# Patient Record
Sex: FEMALE | Race: BLACK OR AFRICAN AMERICAN | Employment: FULL TIME | URBAN - METROPOLITAN AREA
[De-identification: names, ages, dates, MRNs, and addresses within clinical notes are randomized per-mention and may not be internally consistent; named-entity substitution may affect disease eponyms.]

---

## 2018-12-25 ENCOUNTER — HOSPITAL ENCOUNTER (EMERGENCY)
Age: 58
Discharge: HOME | End: 2018-12-25
Payer: COMMERCIAL

## 2018-12-25 ENCOUNTER — APPOINTMENT (OUTPATIENT)
Dept: GENERAL RADIOLOGY | Age: 58
End: 2018-12-25
Payer: COMMERCIAL

## 2018-12-25 VITALS
TEMPERATURE: 98 F | DIASTOLIC BLOOD PRESSURE: 72 MMHG | SYSTOLIC BLOOD PRESSURE: 146 MMHG | HEART RATE: 102 BPM | OXYGEN SATURATION: 96 % | WEIGHT: 250 LBS | RESPIRATION RATE: 15 BRPM

## 2018-12-25 DIAGNOSIS — S92.425A CLOSED NONDISPLACED FRACTURE OF DISTAL PHALANX OF LEFT GREAT TOE, INITIAL ENCOUNTER: Primary | ICD-10-CM

## 2018-12-25 PROCEDURE — 73660 X-RAY EXAM OF TOE(S): CPT

## 2018-12-25 PROCEDURE — 99283 EMERGENCY DEPT VISIT LOW MDM: CPT

## 2018-12-25 PROCEDURE — 4500000021 HC ED LEVEL 1 PROCEDURE

## 2018-12-25 RX ORDER — LIDOCAINE HYDROCHLORIDE 20 MG/ML
10 INJECTION, SOLUTION INFILTRATION; PERINEURAL ONCE
Status: DISCONTINUED | OUTPATIENT
Start: 2018-12-25 | End: 2018-12-25 | Stop reason: HOSPADM

## 2018-12-25 RX ORDER — BUPIVACAINE HYDROCHLORIDE 5 MG/ML
30 INJECTION, SOLUTION EPIDURAL; INTRACAUDAL ONCE
Status: DISCONTINUED | OUTPATIENT
Start: 2018-12-25 | End: 2018-12-25 | Stop reason: HOSPADM

## 2018-12-25 RX ORDER — CEPHALEXIN 500 MG/1
500 CAPSULE ORAL 4 TIMES DAILY
Qty: 40 CAPSULE | Refills: 0 | Status: SHIPPED | OUTPATIENT
Start: 2018-12-25 | End: 2019-01-04

## 2018-12-25 RX ORDER — LIDOCAINE HYDROCHLORIDE 20 MG/ML
INJECTION, SOLUTION EPIDURAL; INFILTRATION; INTRACAUDAL; PERINEURAL
Status: DISCONTINUED
Start: 2018-12-25 | End: 2018-12-25 | Stop reason: HOSPADM

## 2018-12-25 RX ORDER — HYDROCODONE BITARTRATE AND ACETAMINOPHEN 5; 325 MG/1; MG/1
1 TABLET ORAL EVERY 6 HOURS PRN
Qty: 15 TABLET | Refills: 0 | Status: SHIPPED | OUTPATIENT
Start: 2018-12-25 | End: 2018-12-30

## 2018-12-25 ASSESSMENT — PAIN SCALES - GENERAL: PAINLEVEL_OUTOF10: 6

## 2018-12-25 ASSESSMENT — PAIN DESCRIPTION - PAIN TYPE: TYPE: ACUTE PAIN

## 2018-12-26 ASSESSMENT — ENCOUNTER SYMPTOMS
COLOR CHANGE: 1
NAUSEA: 0
ABDOMINAL PAIN: 0
DIARRHEA: 0
VOMITING: 0
SHORTNESS OF BREATH: 0
CHEST TIGHTNESS: 0

## 2018-12-26 NOTE — ED PROVIDER NOTES
department and verbalizes an understanding of these signs and symptoms. I estimate there is low risk for compartment syndrome, deep venous thrombosis, limb-threatening infectious, tendon and/or neurovascular injury and therefore I consider the discharge disposition reasonable. Leida Khan and I have discussed the diagnosis and risks, and we agree with discharging home to follow-up with their primary care provider and/or the referring orthopedist on call. We also discussed returning to the emergency department immediately if new or worsening symptoms occur. We have discussed the symptoms which are most concerning (e.g., changing or worsening pain, numbness, weakness) that necessitate immediate return. The patient tolerated their visit well. I evaluated the patient. The physician was available for consultation as needed. The patient and / or the family were informed of the results of anytests, a time was given to answer questions, a plan was proposed and they agreed with plan. CLINICAL IMPRESSION:  1. Closed nondisplaced fracture of distal phalanx of left great toe, initial encounter        DISPOSITION Decision To Discharge 12/25/2018 06:40:21 PM      PATIENT REFERRED TO:  Amalia Dubon, [address]  [phone]    Schedule an appointment as soon as possible for a visit       [facility] Emergency Department  [address] [place] [address]  [phone]    If symptoms worsen      DISCHARGE MEDICATIONS:  Discharge Medication List as of 12/25/2018  6:43 PM      START taking these medications    Details   cephALEXin (KEFLEX) 500 MG capsule Take 1 capsule by mouth 4 times daily for 10 days, Disp-40 capsule, R-0Print      HYDROcodone-acetaminophen (NORCO) 5-325 MG per tablet Take 1 tablet by mouth every 6 hours as needed for Pain for up to 5 days. ., Disp-15 tablet, R-0Print             DISCONTINUED MEDICATIONS:  Discharge Medication List as of 12/25/2018

## 2019-05-09 ENCOUNTER — APPOINTMENT (OUTPATIENT)
Dept: GENERAL RADIOLOGY | Age: 59
End: 2019-05-09
Payer: COMMERCIAL

## 2019-05-09 ENCOUNTER — HOSPITAL ENCOUNTER (EMERGENCY)
Age: 59
Discharge: HOME | End: 2019-05-09
Payer: COMMERCIAL

## 2019-05-09 VITALS
OXYGEN SATURATION: 96 % | DIASTOLIC BLOOD PRESSURE: 80 MMHG | HEART RATE: 80 BPM | SYSTOLIC BLOOD PRESSURE: 150 MMHG | WEIGHT: 262 LBS | RESPIRATION RATE: 18 BRPM | TEMPERATURE: 97.7 F
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
## 2019-05-09 DIAGNOSIS — M23.91 INTERNAL DERANGEMENT OF RIGHT KNEE: Primary | ICD-10-CM

## 2019-05-09 PROCEDURE — 99283 EMERGENCY DEPT VISIT LOW MDM: CPT

## 2019-05-09 PROCEDURE — 6370000000 HC RX 637 (ALT 250 FOR IP): Performed by: NURSE PRACTITIONER

## 2019-05-09 PROCEDURE — 73562 X-RAY EXAM OF KNEE 3: CPT

## 2019-05-09 RX ORDER — IBUPROFEN 800 MG/1
800 TABLET ORAL ONCE
Status: COMPLETED | OUTPATIENT
Start: 2019-05-09 | End: 2019-05-09

## 2019-05-09 RX ORDER — IBUPROFEN 800 MG/1
800 TABLET ORAL EVERY 8 HOURS PRN
Qty: 20 TABLET | Refills: 0 | Status: SHIPPED | OUTPATIENT
Start: 2019-05-09

## 2019-05-09 RX ADMIN — IBUPROFEN 800 MG: 800 TABLET, FILM COATED ORAL at 10:43

## 2019-05-09 SDOH — HEALTH STABILITY: MENTAL HEALTH: HOW OFTEN DO YOU HAVE A DRINK CONTAINING ALCOHOL?: NEVER

## 2019-05-09 ASSESSMENT — ENCOUNTER SYMPTOMS
NAUSEA: 0
SHORTNESS OF BREATH: 0
DIARRHEA: 0
ABDOMINAL PAIN: 0
VOMITING: 0
CHEST TIGHTNESS: 0

## 2019-05-09 ASSESSMENT — PAIN SCALES - GENERAL
PAINLEVEL_OUTOF10: 10
PAINLEVEL_OUTOF10: 10

## 2019-05-09 ASSESSMENT — PAIN DESCRIPTION - PAIN TYPE: TYPE: ACUTE PAIN

## 2019-05-09 NOTE — ED NOTES
Patient discharged to home in stable condition with family via private car. Discharge instructions and prescriptions reviewed with patient and family members. Patient and family verbalized understanding. All belongings in tow including discharge paperwork.                    Gurinder Armstrong RN  05/09/19 9031

## 2019-05-09 NOTE — ED NOTES
Pt back from xray.  Patient brought warm blanket at patient's request.     Keely Arora RN  05/09/19 0942

## 2019-05-09 NOTE — ED PROVIDER NOTES
[facility]        Pt Name: Bri Spears  MRN: [id number]  [place] 1960  Date of evaluation: 5/9/2019  Provider: JENI Trevizo CNP  PCP: Joe Miranda    This patient was not seen and evaluated by the attending physician No att. providers found. CHIEF COMPLAINT       Chief Complaint   Patient presents with    Knee Injury     Pt to ER with right knee pain after twisting wrong and injury her knee at worse, denies previous injury, states unable to bare weight       HISTORY OF PRESENT ILLNESS   (Location/Symptom, Timing/Onset, Context/Setting, Quality, Duration, Modifying Factors, Severity)  Note limiting factors. Bri Spears is a 59 y.o. female presents the emergency department with swelling and pain to the right external knee. 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 She reports pain with flexion and extension, and difficulty with weightbearing. Denies mitigating factors or previous injury. Denies any headache, fever, lightheadedness, dizziness, visual disturbances. No chest pain or pressure. No neck or back pain. No shortness of breath, cough, or congestion. No abdominal pain, nausea, vomiting, diarrhea, constipation, or dysuria. No rash. Nursing Notes were all reviewed and agreed with or any disagreements were addressed  in the HPI. REVIEW OF SYSTEMS    (2-9 systems for level 4, 10 or more for level 5)     Review of Systems   Constitutional: Negative for activity change, chills and fever. Respiratory: Negative for chest tightness and shortness of breath. Cardiovascular: Negative for chest pain. Gastrointestinal: Negative for abdominal pain, diarrhea, nausea and vomiting. Genitourinary: Negative for dysuria.    Musculoskeletal: Positive for joint swelling. All other systems reviewed and are negative. Positives and Pertinent negatives as per HPI. Except as noted abovein the ROS, all other systems were reviewed and negative. PAST MEDICAL HISTORY     Past Medical History:   Diagnosis Date    Diabetes mellitus ([address]     Hyperlipidemia     Hypertension          SURGICAL HISTORY     Past Surgical History:   Procedure Laterality Date    HYSTERECTOMY      TONSILLECTOMY           CURRENTMEDICATIONS       Discharge Medication List as of 5/9/2019 12:01 PM      CONTINUE these medications which have NOT CHANGED    Details   METFORMIN HCL PO Take by mouthHistorical Med      LISINOPRIL PO Take by mouthHistorical Med      HYDROCHLOROTHIAZIDE PO Take by mouthHistorical Med      Multiple Vitamins-Minerals (MULTIVITAMIN ADULTS PO) Take by mouthHistorical Med      aspirin 81 MG tablet Take 81 mg by mouth dailyHistorical Med      FERROUS SULFATE DRIED PO Take by mouthHistorical Med               ALLERGIES     Patient has no known allergies. FAMILYHISTORY     History reviewed. No pertinent family history.        SOCIAL HISTORY       Social History     Socioeconomic History    Marital status:      Spouse name: None    Number of children: None    Years of education: None    Highest education level: None   Occupational History    None   Social Needs    Financial resource strain: None    Food insecurity:     Worry: None     Inability: None    Transportation needs:     Medical: None     Non-medical: None   Tobacco Use    Smoking status: Never Smoker    Smokeless tobacco: Never Used   Substance and Sexual Activity    Alcohol use: Yes     Frequency: Never     Comment: occ    Drug use: Never    Sexual activity: None   Lifestyle    Physical activity:     Days per week: None     Minutes per session: None    Stress: None   Relationships    Social connections:     Talks on phone: None     Gets together: None     Attends Anabaptist service: None Active member of club or organization: None     Attends meetings of clubs or organizations: None     Relationship status: None    Intimate partner violence:     Fear of current or ex partner: None     Emotionally abused: None     Physically abused: None     Forced sexual activity: None   Other Topics Concern    None   Social History Narrative    None       SCREENINGS             PHYSICAL EXAM    (up to 7 for level 4, 8 or more for level 5)     ED Triage Vitals   BP Temp Temp src Pulse Resp SpO2 Height Weight   -- -- -- -- -- -- -- --       Physical Exam   Constitutional: She is oriented to person, place, and time. She appears well-developed and well-nourished. No distress. HENT:   Head: Normocephalic and atraumatic. Right Ear: External ear normal.   Left Ear: External ear normal.   Eyes: Right eye exhibits no discharge. Left eye exhibits no discharge. Neck: Normal range of motion. Neck supple. No JVD present. Cardiovascular: Normal rate and regular rhythm. Exam reveals no friction rub. No murmur heard. Pulmonary/Chest: Effort normal and breath sounds normal. No stridor. No respiratory distress. She has no wheezes. Abdominal: Soft. She exhibits no mass. There is no tenderness. Musculoskeletal: Normal range of motion. Right knee: She exhibits swelling. Tenderness found. Neurological: She is alert and oriented to person, place, and time. Skin: Skin is warm and dry. She is not diaphoretic. No pallor. Psychiatric: She has a normal mood and affect. Her behavior is normal.   Nursing note and vitals reviewed. DIAGNOSTIC RESULTS   LABS:    Labs Reviewed - No data to display    All other labs were within normal range or not returned as of this dictation. EKG: All EKG's are interpreted by the Emergency Department Physician who either signs orCo-signs this chart in the absence of a cardiologist.  Please see their note for interpretation of EKG.       RADIOLOGY:   Non-plain film images such as CT, Ultrasound and MRI are read by the radiologist. Plain radiographic images are visualized andpreliminarily interpreted by the  ED Provider with the below findings:        Interpretation perthe Radiologist below, if available at the time of this note:    XR KNEE RIGHT (3 VIEWS)   Final Result   1. No acute bony or joint abnormality   2. Tricompartmental osteoarthritic changes           No results found. PROCEDURES   Unless otherwise noted below, none     Procedures    CRITICAL CARE TIME   N/A    CONSULTS:  None      EMERGENCY DEPARTMENT COURSE and DIFFERENTIALDIAGNOSIS/MDM:   Vitals:    Vitals:    05/09/19 1032   BP: (!) 150/80   Pulse: 80   Resp: 18   Temp: 97.7 °F (36.5 °C)   TempSrc: Oral   SpO2: 96%   Weight: 262 lb (118.8 kg)       Patient was given thefollowing medications:  Medications   ibuprofen (ADVIL;MOTRIN) tablet 800 mg (800 mg Oral Given 5/9/19 1043)       Briefly, this is a 59year old female that presents the emergency department with swelling and pain to the right external knee. 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 She reports pain with flexion and extension, and difficulty with weightbearing. Denies mitigating factors or previous injury. Xray right knee   Impression:    1. No acute bony or joint abnormality  2. Tricompartmental osteoarthritic changes        Ibuprofen given in the ER. Knee immobilizer and crutches given in the emergency department. Follow-up with occupational medicine as this is a workplace injury. Ibuprofen for pain. Instructed on rest ice compression and elevation. Return for new or worsening symptoms and see orthopedics as directed.     I estimate there is LOW risk for FRACTURE, COMPARTMENT SYNDROME, DEEP VENOUS THROMBOSIS, SEPTIC ARTHRITIS, TENDON OR NEUROVASCULAR INJURY, thus I consider the discharge disposition reasonable. FINAL IMPRESSION      1. Internal derangement of right knee    2.  Work related injury          DISPOSITION/PLAN   DISPOSITION Decision To Discharge 05/09/2019 11:53:34 AM      PATIENT REFERREDTO:  Riccardo Dawit [address]  [phone]      If symptoms worsen    Prentice Claude, MD  [facility], [address]  [address] [place] [address]  [phone]    Schedule an appointment as soon as possible for a visit       *[address] [facility] [address]  [phone]    Schedule an appointment as soon as possible for a visit         DISCHARGE MEDICATIONS:  Discharge Medication List as of 5/9/2019 12:01 PM      START taking these medications    Details   ibuprofen (ADVIL;MOTRIN) 800 MG tablet Take 1 tablet by mouth every 8 hours as needed for Pain or Fever, Disp-20 tablet, R-0Print             DISCONTINUED MEDICATIONS:  Discharge Medication List as of 5/9/2019 12:01 PM                 (Please note that portions ofthis note were completed with a voice recognition program.  Efforts were made to edit the dictations but occasionally words are mis-transcribed.)    JENI Barger CNP (electronically signed)           JENI Barger CNP  05/09/19 9284

## 2019-05-09 NOTE — ED NOTES
Crutch Teaching. Patient instructed, understands, and demonstrates crutches, per April, Tech. Knee immobilizer applied by April, Tech.      Monet Knapp RN  05/09/19 7266

## 2019-05-14 ENCOUNTER — OFFICE VISIT (OUTPATIENT)
Dept: ORTHOPEDIC SURGERY | Age: 59
End: 2019-05-14
Payer: COMMERCIAL

## 2019-05-14 VITALS
DIASTOLIC BLOOD PRESSURE: 73 MMHG | BODY MASS INDEX: 43.2 KG/M2 | RESPIRATION RATE: 17 BRPM | WEIGHT: 259.3 LBS | SYSTOLIC BLOOD PRESSURE: 118 MMHG | HEART RATE: 77 BPM | HEIGHT: 65 IN

## 2019-05-14 DIAGNOSIS — M25.461 EFFUSION OF RIGHT KNEE: ICD-10-CM

## 2019-05-14 DIAGNOSIS — E66.01 MORBID OBESITY (HCC): ICD-10-CM

## 2019-05-14 DIAGNOSIS — M25.561 ACUTE PAIN OF RIGHT KNEE: ICD-10-CM

## 2019-05-14 DIAGNOSIS — M17.11 PRIMARY OSTEOARTHRITIS OF RIGHT KNEE: Primary | ICD-10-CM

## 2019-05-14 PROCEDURE — 99203 OFFICE O/P NEW LOW 30 MIN: CPT | Performed by: ORTHOPAEDIC SURGERY

## 2019-05-14 NOTE — LETTER
[facility] [address] [address]  Phone: [phone]  Fax: [phone]    Diaz Dubois MD        May 16, 2019     [address] [place] [address] [address] [address]    Patient: Ananda Gilmore  MR Number: [id number]  YOB: 1960  Date of Visit: 5/14/2019    Dear Dr. Panda Arriaza:    Thank you for the request for consultation for Ananda Gilmore to me for the evaluation. Below are the relevant portions of my assessment and plan of care. CHIEF COMPLAINT: Right knee pain. DATE OF INJURY: 5/9/19    History:   Ananda Gilmore is a 59 y.o. morbidly obese female referred by [facility] for evaluation and treatment of right knee pain / injury. The patient complains of right knee pain. This is evaluated as a workers compensation injury. There was a history of injury. 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 The pain began 5 days ago. The pain is located lateral. She describes the symptoms as aching and stabbing. She rates pain at 3/10. Symptoms improve with ice, ibuprofen, tumeric. The symptoms are worse with walking, flexion. The knee has not given out or felt unstable. The patient cannot bend and straighten the knee fully. There is swelling in the knee. There was not catching / locking of the knee. The patient has not had PT. The patient has not had an injection. The patient has taken NSAIDs. The patient has tried ice. The patient's occupation is . She does have known history of bilateral knee arthritis, though left has bothered her more in the past.  She did see physician at [address] [place] [address] years ago for her left knee and had injection. Outside reports reviewed: ER records and office notes.     Past Medical History:   Diagnosis Date    Diabetes mellitus ([address]     Hyperlipidemia     Hypertension        Past Surgical History: Procedure Laterality Date    HYSTERECTOMY      TONSILLECTOMY         History reviewed. No pertinent family history. Social History     Socioeconomic History    Marital status:      Spouse name: None    Number of children: None    Years of education: None    Highest education level: None   Occupational History    None   Social Needs    Financial resource strain: None    Food insecurity:     Worry: None     Inability: None    Transportation needs:     Medical: None     Non-medical: None   Tobacco Use    Smoking status: Never Smoker    Smokeless tobacco: Never Used   Substance and Sexual Activity    Alcohol use: Yes     Frequency: Never     Comment: occ    Drug use: Never    Sexual activity: None   Lifestyle    Physical activity:     Days per week: None     Minutes per session: None    Stress: None   Relationships    Social connections:     Talks on phone: None     Gets together: None     Attends Episcopalian service: None     Active member of club or organization: None     Attends meetings of clubs or organizations: None     Relationship status: None    Intimate partner violence:     Fear of current or ex partner: None     Emotionally abused: None     Physically abused: None     Forced sexual activity: None   Other Topics Concern    None   Social History Narrative    None       Current Outpatient Medications   Medication Sig Dispense Refill    METFORMIN HCL PO Take by mouth      LISINOPRIL PO Take by mouth      HYDROCHLOROTHIAZIDE PO Take by mouth      Multiple Vitamins-Minerals (MULTIVITAMIN ADULTS PO) Take by mouth      aspirin 81 MG tablet Take 81 mg by mouth daily      FERROUS SULFATE DRIED PO Take by mouth      ibuprofen (ADVIL;MOTRIN) 800 MG tablet Take 1 tablet by mouth every 8 hours as needed for Pain or Fever 20 tablet 0     No current facility-administered medications for this visit.         No Known Allergies    Review of Systems: Pertinent items are noted in HPI. 13 point review of systems from Patient History Form dated 5/14/19 and available in the patient's chart under the Media tab. Physical Examination:     Vital signs:   /73   Pulse 77   Resp 17   Ht 5' 5\" (1.651 m)   Wt 259 lb 4.8 oz (117.6 kg)   BMI 43.15 kg/m²     General:  alert, appears stated age, cooperative and no distress   Gait:  Normal. The patient can bear weight on the injured extremity. Right Knee  Alignment:  neutral   ROM:  0 degrees extension to 90 degrees flexion   Left knee: 0 degrees extension, 120 flexion   Crepitus:  patellar   Joint Tenderness:  lateral joint line and medial joint line   Effusion:   20 cc   Patellar excursion:  2 of 4 quadrants    Patellar tilt test:  negative   Patellar facet tenderness:  negative medial   negative lateral   Trochlear tenderness:  negative   Anterior drawer test:  negative   Posterior drawer:   negative   Varus laxity at 30 degrees:  negative   Left knee: negative   Valgus laxity at 30 degrees:   negative   Left knee: negative   Nj's test:  not tested   Zoraida's test:  negative     There is not any cellulitis, lymphedema or cutaneous lesions noted in the lower extremities. Motor exam of the lower extremities show quadriceps, hamstrings, foot dorsiflexion and plantarflexion grossly intact. Sensation to both feet is grossly intact to light touch. The bilateral lower extremities are warm and well-perfused with brisk capillary refill. Imaging:  Right Knee X-Ray: Bilateral sunrise and standing PA xrays of the knee were obtained and reviewed. Lateral view from outside reviewed. No fracture. Moderate tibiofemoral narrowing on right, moderate to severe on left. Tricompartmental osteophytes      Assessment:     Right knee osteoarthritis  Right knee effusion  Morbid obesity      Plan:     Natural history and expected course discussed. Questions answered. Discussed that she likely has aggravation of pre-existing arthritis. She could have a degenerative meniscus tear also, since she has an effusion. Her pain is improving, so will continue to treat conservatively. Continue ice. Continue NSAIDs. Discontinue immobilizer. Letter for work with restrictions. Will request injection from [facility]. Follow up in 2 weeks. If you have questions, please do not hesitate to call me. I look forward to following Rodríguez Arriaga along with you.     Sincerely,        Marilu Terry MD

## 2019-05-14 NOTE — PROGRESS NOTES
CHIEF COMPLAINT: Right knee pain. DATE OF INJURY: 5/9/19    History:   Min Roy is a 59 y.o. morbidly obese female referred by [facility] for evaluation and treatment of right knee pain / injury. The patient complains of right knee pain. This is evaluated as a workers compensation injury. There was a history of injury. 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 The pain began 5 days ago. The pain is located lateral. She describes the symptoms as aching and stabbing. She rates pain at 3/10. Symptoms improve with ice, ibuprofen, tumeric. The symptoms are worse with walking, flexion. The knee has not given out or felt unstable. The patient cannot bend and straighten the knee fully. There is swelling in the knee. There was not catching / locking of the knee. The patient has not had PT. The patient has not had an injection. The patient has taken NSAIDs. The patient has tried ice. The patient's occupation is . She does have known history of bilateral knee arthritis, though left has bothered her more in the past.  She did see physician at [address] [place] [address] years ago for her left knee and had injection. Outside reports reviewed: ER records and office notes. Past Medical History:   Diagnosis Date    Diabetes mellitus ([address]     Hyperlipidemia     Hypertension        Past Surgical History:   Procedure Laterality Date    HYSTERECTOMY      TONSILLECTOMY         History reviewed. No pertinent family history.     Social History     Socioeconomic History    Marital status:      Spouse name: None    Number of children: None    Years of education: None    Highest education level: None   Occupational History    None   Social Needs    Financial resource strain: None    Food insecurity:     Worry: None     Inability: None    Transportation needs:     Medical: None     Non-medical: None   Tobacco Use    Smoking status: Never Smoker    Smokeless tobacco: Never Used   Substance and Sexual Activity    Alcohol use: Yes     Frequency: Never     Comment: occ    Drug use: Never    Sexual activity: None   Lifestyle    Physical activity:     Days per week: None     Minutes per session: None    Stress: None   Relationships    Social connections:     Talks on phone: None     Gets together: None     Attends Moravian service: None     Active member of club or organization: None     Attends meetings of clubs or organizations: None     Relationship status: None    Intimate partner violence:     Fear of current or ex partner: None     Emotionally abused: None     Physically abused: None     Forced sexual activity: None   Other Topics Concern    None   Social History Narrative    None       Current Outpatient Medications   Medication Sig Dispense Refill    METFORMIN HCL PO Take by mouth      LISINOPRIL PO Take by mouth      HYDROCHLOROTHIAZIDE PO Take by mouth      Multiple Vitamins-Minerals (MULTIVITAMIN ADULTS PO) Take by mouth      aspirin 81 MG tablet Take 81 mg by mouth daily      FERROUS SULFATE DRIED PO Take by mouth      ibuprofen (ADVIL;MOTRIN) 800 MG tablet Take 1 tablet by mouth every 8 hours as needed for Pain or Fever 20 tablet 0     No current facility-administered medications for this visit. No Known Allergies    Review of Systems:  Pertinent items are noted in HPI. 13 point review of systems from Patient History Form dated 5/14/19 and available in the patient's chart under the Media tab. Physical Examination:     Vital signs:   /73   Pulse 77   Resp 17   Ht 5' 5\" (1.651 m)   Wt 259 lb 4.8 oz (117.6 kg)   BMI 43.15 kg/m²    General:  alert, appears stated age, cooperative and no distress   Gait:  Normal. The patient can bear weight on the injured extremity.      Right Knee  Alignment:  neutral   ROM:  0 degrees extension to 90 degrees flexion   Left knee: 0 degrees extension, 120 flexion Crepitus:  patellar   Joint Tenderness:  lateral joint line and medial joint line   Effusion:   20 cc   Patellar excursion:  2 of 4 quadrants    Patellar tilt test:  negative   Patellar facet tenderness:  negative medial   negative lateral   Trochlear tenderness:  negative   Anterior drawer test:  negative   Posterior drawer:   negative   Varus laxity at 30 degrees:  negative   Left knee: negative   Valgus laxity at 30 degrees:   negative   Left knee: negative   Nj's test:  not tested   Zoraida's test:  negative     There is not any cellulitis, lymphedema or cutaneous lesions noted in the lower extremities. Motor exam of the lower extremities show quadriceps, hamstrings, foot dorsiflexion and plantarflexion grossly intact. Sensation to both feet is grossly intact to light touch. The bilateral lower extremities are warm and well-perfused with brisk capillary refill. Imaging:  Right Knee X-Ray: Bilateral sunrise and standing PA xrays of the knee were obtained and reviewed. Lateral view from outside reviewed. No fracture. Moderate tibiofemoral narrowing on right, moderate to severe on left. Tricompartmental osteophytes      Assessment:     Right knee osteoarthritis  Right knee effusion  Morbid obesity      Plan:     Natural history and expected course discussed. Questions answered. Discussed that she likely has aggravation of pre-existing arthritis. She could have a degenerative meniscus tear also, since she has an effusion. Her pain is improving, so will continue to treat conservatively. Continue ice. Continue NSAIDs. Discontinue immobilizer. Letter for work with restrictions. Will request injection from [facility]. Follow up in 2 weeks.

## 2019-05-16 NOTE — COMMUNICATION BODY
CHIEF COMPLAINT: Right knee pain. DATE OF INJURY: 5/9/19    History:   Radames Mayes is a 59 y.o. morbidly obese female referred by [facility] ER for evaluation and treatment of right knee pain / injury. The patient complains of right knee pain. This is evaluated as a workers compensation injury. There was a history of injury. 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 The pain began 5 days ago. The pain is located lateral. She describes the symptoms as aching and stabbing. She rates pain at 3/10. Symptoms improve with ice, ibuprofen, tumeric. The symptoms are worse with walking, flexion. The knee has not given out or felt unstable. The patient cannot bend and straighten the knee fully. There is swelling in the knee. There was not catching / locking of the knee. The patient has not had PT. The patient has not had an injection. The patient has taken NSAIDs. The patient has tried ice. The patient's occupation is . She does have known history of bilateral knee arthritis, though left has bothered her more in the past.  She did see physician at [id number] [place] [address] years ago for her left knee and had injection. Outside reports reviewed: ER records and office notes. Past Medical History:   Diagnosis Date    Diabetes mellitus ([address]     Hyperlipidemia     Hypertension        Past Surgical History:   Procedure Laterality Date    HYSTERECTOMY      TONSILLECTOMY         History reviewed. No pertinent family history.     Social History     Socioeconomic History    Marital status:      Spouse name: None    Number of children: None    Years of education: None    Highest education level: None   Occupational History    None   Social Needs    Financial resource strain: None    Food insecurity:     Worry: None     Inability: None    Transportation needs:     Medical: None     Non-medical: None   Tobacco Use    Smoking status: Never Smoker    Smokeless tobacco: Never Used   Substance and Sexual Activity    Alcohol use: Yes     Frequency: Never     Comment: occ    Drug use: Never    Sexual activity: None   Lifestyle    Physical activity:     Days per week: None     Minutes per session: None    Stress: None   Relationships    Social connections:     Talks on phone: None     Gets together: None     Attends Baptism service: None     Active member of club or organization: None     Attends meetings of clubs or organizations: None     Relationship status: None    Intimate partner violence:     Fear of current or ex partner: None     Emotionally abused: None     Physically abused: None     Forced sexual activity: None   Other Topics Concern    None   Social History Narrative    None       Current Outpatient Medications   Medication Sig Dispense Refill    METFORMIN HCL PO Take by mouth      LISINOPRIL PO Take by mouth      HYDROCHLOROTHIAZIDE PO Take by mouth      Multiple Vitamins-Minerals (MULTIVITAMIN ADULTS PO) Take by mouth      aspirin 81 MG tablet Take 81 mg by mouth daily      FERROUS SULFATE DRIED PO Take by mouth      ibuprofen (ADVIL;MOTRIN) 800 MG tablet Take 1 tablet by mouth every 8 hours as needed for Pain or Fever 20 tablet 0     No current facility-administered medications for this visit. No Known Allergies    Review of Systems:  Pertinent items are noted in HPI. 13 point review of systems from Patient History Form dated 5/14/19 and available in the patient's chart under the Media tab. Physical Examination:     Vital signs:   /73   Pulse 77   Resp 17   Ht 5' 5\" (1.651 m)   Wt 259 lb 4.8 oz (117.6 kg)   BMI 43.15 kg/m²    General:  alert, appears stated age, cooperative and no distress   Gait:  Normal. The patient can bear weight on the injured extremity.      Right Knee  Alignment:  neutral   ROM:  0 degrees extension to 90 degrees flexion   Left knee: 0 degrees extension, 120 flexion Crepitus:  patellar   Joint Tenderness:  lateral joint line and medial joint line   Effusion:   20 cc   Patellar excursion:  2 of 4 quadrants    Patellar tilt test:  negative   Patellar facet tenderness:  negative medial   negative lateral   Trochlear tenderness:  negative   Anterior drawer test:  negative   Posterior drawer:   negative   Varus laxity at 30 degrees:  negative   Left knee: negative   Valgus laxity at 30 degrees:   negative   Left knee: negative   Nj's test:  not tested   Zoraida's test:  negative     There is not any cellulitis, lymphedema or cutaneous lesions noted in the lower extremities. Motor exam of the lower extremities show quadriceps, hamstrings, foot dorsiflexion and plantarflexion grossly intact. Sensation to both feet is grossly intact to light touch. The bilateral lower extremities are warm and well-perfused with brisk capillary refill. Imaging:  Right Knee X-Ray: Bilateral sunrise and standing PA xrays of the knee were obtained and reviewed. Lateral view from outside reviewed. No fracture. Moderate tibiofemoral narrowing on right, moderate to severe on left. Tricompartmental osteophytes      Assessment:     Right knee osteoarthritis  Right knee effusion  Morbid obesity      Plan:     Natural history and expected course discussed. Questions answered. Discussed that she likely has aggravation of pre-existing arthritis. She could have a degenerative meniscus tear also, since she has an effusion. Her pain is improving, so will continue to treat conservatively. Continue ice. Continue NSAIDs. Discontinue immobilizer. Letter for work with restrictions. Will request injection from [facility]. Follow up in 2 weeks.

## 2019-05-30 ENCOUNTER — OFFICE VISIT (OUTPATIENT)
Dept: ORTHOPEDIC SURGERY | Age: 59
End: 2019-05-30
Payer: COMMERCIAL

## 2019-05-30 VITALS
BODY MASS INDEX: 43.49 KG/M2 | HEIGHT: 65 IN | WEIGHT: 261 LBS | DIASTOLIC BLOOD PRESSURE: 86 MMHG | SYSTOLIC BLOOD PRESSURE: 138 MMHG | HEART RATE: 73 BPM

## 2019-05-30 DIAGNOSIS — M17.11 PRIMARY OSTEOARTHRITIS OF RIGHT KNEE: Primary | ICD-10-CM

## 2019-05-30 PROCEDURE — 20610 DRAIN/INJ JOINT/BURSA W/O US: CPT | Performed by: ORTHOPAEDIC SURGERY

## 2019-05-30 PROCEDURE — 99213 OFFICE O/P EST LOW 20 MIN: CPT | Performed by: ORTHOPAEDIC SURGERY

## 2019-05-30 RX ORDER — GLIMEPIRIDE 1 MG/1
1 TABLET ORAL
COMMUNITY
Start: 2019-03-28

## 2019-05-30 RX ORDER — BLOOD SUGAR DIAGNOSTIC
STRIP MISCELLANEOUS
COMMUNITY
Start: 2019-05-28

## 2019-05-30 RX ORDER — TRIAMCINOLONE ACETONIDE 40 MG/ML
40 INJECTION, SUSPENSION INTRA-ARTICULAR; INTRAMUSCULAR ONCE
Status: COMPLETED | OUTPATIENT
Start: 2019-05-30 | End: 2019-05-30

## 2019-05-30 RX ORDER — LOVASTATIN 40 MG/1
TABLET ORAL
COMMUNITY
Start: 2019-03-28

## 2019-05-30 RX ADMIN — TRIAMCINOLONE ACETONIDE 40 MG: 40 INJECTION, SUSPENSION INTRA-ARTICULAR; INTRAMUSCULAR at 11:31

## 2019-05-30 NOTE — PROGRESS NOTES
CHIEF COMPLAINT: Right knee pain. DATE OF INJURY: 5/9/19    History:   Tye Medley is a 59 y.o. morbidly obese female here for right knee follow up. Initial history:  referred by [facility] ER for evaluation and treatment of right knee pain / injury. The patient complains of right knee pain. This is evaluated as a workers compensation injury. There was a history of injury. 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 The pain began 5 days ago. The pain is located lateral. She describes the symptoms as aching and stabbing. She rates pain at 3/10. Symptoms improve with ice, ibuprofen, tumeric. The symptoms are worse with walking, flexion. The knee has not given out or felt unstable. The patient cannot bend and straighten the knee fully. There is swelling in the knee. There was not catching / locking of the knee. The patient has not had PT. The patient has not had an injection. The patient has taken NSAIDs. The patient has tried ice. The patient's occupation is . She does have known history of bilateral knee arthritis, though left has bothered her more in the past.  She did see physician at [id number] [place] [address] years ago for her left knee and had injection. Interval History:  Her knee feels better. Rates pain 2/10. She has not been working because school is out. She would still like the injection. Past Medical History:   Diagnosis Date    Diabetes mellitus ([address]     Hyperlipidemia     Hypertension        Past Surgical History:   Procedure Laterality Date    HYSTERECTOMY      TONSILLECTOMY         History reviewed. No pertinent family history.     Social History     Socioeconomic History    Marital status:      Spouse name: None    Number of children: None    Years of education: None    Highest education level: None   Occupational History    None   Social Needs    Financial resource strain: None   [employer] insecurity:     Worry: None     Inability: None    Transportation needs:     Medical: None     Non-medical: None   Tobacco Use    Smoking status: Never Smoker    Smokeless tobacco: Never Used   Substance and Sexual Activity    Alcohol use: Yes     Frequency: Never     Comment: occ    Drug use: Never    Sexual activity: None   Lifestyle    Physical activity:     Days per week: None     Minutes per session: None    Stress: None   Relationships    Social connections:     Talks on phone: None     Gets together: None     Attends Sikhism service: None     Active member of club or organization: None     Attends meetings of clubs or organizations: None     Relationship status: None    Intimate partner violence:     Fear of current or ex partner: None     Emotionally abused: None     Physically abused: None     Forced sexual activity: None   Other Topics Concern    None   Social History Narrative    None       Current Outpatient Medications   Medication Sig Dispense Refill    METFORMIN HCL PO Take by mouth      LISINOPRIL PO Take by mouth      HYDROCHLOROTHIAZIDE PO Take by mouth      Multiple Vitamins-Minerals (MULTIVITAMIN ADULTS PO) Take by mouth      aspirin 81 MG tablet Take 81 mg by mouth daily      FERROUS SULFATE DRIED PO Take by mouth      ibuprofen (ADVIL;MOTRIN) 800 MG tablet Take 1 tablet by mouth every 8 hours as needed for Pain or Fever 20 tablet 0     No current facility-administered medications for this visit. No Known Allergies    Review of Systems:  Pertinent items are noted in HPI. 13 point review of systems from Patient History Form dated 5/14/19 and available in the patient's chart under the Media tab. Physical Examination:     Vital signs:   /86   Pulse 73   Ht 5' 5\" (1.651 m)   Wt 261 lb (118.4 kg)   BMI 43.43 kg/m²     General:  alert, appears stated age, cooperative and no distress   Gait:  Normal. The patient can bear weight on the injured extremity. Right Knee  Alignment:  neutral   ROM:  0 degrees extension to 90 degrees flexion   Left knee: 0 degrees extension, 120 flexion   Crepitus:  patellar   Joint Tenderness:  lateral joint line and medial joint line   Effusion:   20 cc   Patellar excursion:  2 of 4 quadrants    Patellar tilt test:  negative   Patellar facet tenderness:  negative medial   negative lateral   Trochlear tenderness:  negative   Anterior drawer test:  negative   Posterior drawer:   negative   Varus laxity at 30 degrees:  negative   Left knee: negative   Valgus laxity at 30 degrees:   negative   Left knee: negative   Nj's test:  not tested   Zoraida's test:  negative     There is not any cellulitis, lymphedema or cutaneous lesions noted in the lower extremities. Motor exam of the lower extremities show quadriceps, hamstrings, foot dorsiflexion and plantarflexion grossly intact. Sensation to both feet is grossly intact to light touch. The bilateral lower extremities are warm and well-perfused with brisk capillary refill. Imaging:  Right Knee X-Ray 5/14/19:  Moderate tibiofemoral narrowing on right, moderate to severe on left. Tricompartmental osteophytes      Assessment:     Right knee osteoarthritis  Right knee effusion  Morbid obesity      Plan:     The risks and benefits of an injection were discussed with the patient. The patient had full opportunity to ask questions and all were answered. The patient then provided verbal informed consent. The skin was then prepped with betadine solution and alcohol. Under aseptic conditions, the  right knee was injected with 4cc of 1% xylocaine, 4cc of 0.25% marcaine, and 1cc of Kenalog (40mg/ml). There were no immediate complications following the injection. The patient was advised of the possibility of injection site reaction and instructed to apply ice to the area and take NSAIDs if able. Continue ice. Continue NSAIDs. Follow up in 6 weeks.

## 2019-07-16 ENCOUNTER — OFFICE VISIT (OUTPATIENT)
Dept: ORTHOPEDIC SURGERY | Age: 59
End: 2019-07-16
Payer: COMMERCIAL

## 2019-07-16 VITALS
WEIGHT: 261.02 LBS | DIASTOLIC BLOOD PRESSURE: 70 MMHG | HEIGHT: 65 IN | RESPIRATION RATE: 17 BRPM | BODY MASS INDEX: 43.49 KG/M2 | SYSTOLIC BLOOD PRESSURE: 111 MMHG | HEART RATE: 76 BPM

## 2019-07-16 DIAGNOSIS — M17.11 PRIMARY OSTEOARTHRITIS OF RIGHT KNEE: Primary | ICD-10-CM

## 2019-07-16 DIAGNOSIS — M25.461 EFFUSION OF RIGHT KNEE: ICD-10-CM

## 2019-07-16 PROCEDURE — 99213 OFFICE O/P EST LOW 20 MIN: CPT | Performed by: ORTHOPAEDIC SURGERY

## 2019-07-18 ENCOUNTER — TELEPHONE (OUTPATIENT)
Dept: ORTHOPEDIC SURGERY | Age: 59
End: 2019-07-18

## 2019-07-18 NOTE — TELEPHONE ENCOUNTER
[employer] states no restrictions but ov states RTW next month. Please advise restrictions until  RTW date.

## 2020-07-20 ENCOUNTER — OFFICE VISIT (OUTPATIENT)
Dept: PRIMARY CARE CLINIC | Age: 60
End: 2020-07-20
Payer: COMMERCIAL

## 2020-07-20 PROCEDURE — 99211 OFF/OP EST MAY X REQ PHY/QHP: CPT | Performed by: NURSE PRACTITIONER

## 2020-07-20 NOTE — PROGRESS NOTES
Rosendo Calhoun received a viral test for COVID-19. They were educated on isolation and quarantine as appropriate. For any symptoms, they were directed to seek care from their PCP, given contact information to establish with a doctor, directed to an urgent care or the emergency room.

## 2020-07-20 NOTE — PATIENT INSTRUCTIONS

## 2020-07-25 LAB
SARS-COV-2: NOT DETECTED
SOURCE: NORMAL

## 2020-07-27 NOTE — RESULT ENCOUNTER NOTE
Your test for COVID-19, also known as novel coronavirus, came back negative. No virus was detected from the sample collected. Testing is not 100%. Until your symptoms are fully resolved, you may still be contagious. We recommend that you remain isolated for 7 days minimum or 72 hours after your symptoms have completely resolved, whichever is longer. If you were exposed to a known positive COVID-19 patient, then you must remain isolated for 14 days. If you were tested for a pre-op, then you remain in isolated until your procedure. Continually monitor symptoms. Contact a medical provider if symptoms are worsening. If you have any additional questions, contact your PCP.     For additional information, please visit the Centers for Disease Control and Prevention Visible Measures.com.cy

## 2023-12-08 ENCOUNTER — APPOINTMENT (OUTPATIENT)
Dept: GENERAL RADIOLOGY | Age: 63
End: 2023-12-08
Payer: OTHER MISCELLANEOUS

## 2023-12-08 ENCOUNTER — HOSPITAL ENCOUNTER (EMERGENCY)
Age: 63
Discharge: HOME | End: 2023-12-08
Attending: EMERGENCY MEDICINE
Payer: OTHER MISCELLANEOUS

## 2023-12-08 VITALS
DIASTOLIC BLOOD PRESSURE: 82 MMHG | HEART RATE: 90 BPM | OXYGEN SATURATION: 97 % | WEIGHT: 253 LBS | TEMPERATURE: 98.5 F | RESPIRATION RATE: 18 BRPM | SYSTOLIC BLOOD PRESSURE: 121 MMHG | BODY MASS INDEX: 42.1 KG/M2

## 2023-12-08 DIAGNOSIS — S49.92XA INJURY OF LEFT SHOULDER, INITIAL ENCOUNTER: ICD-10-CM
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
## 2023-12-08 PROCEDURE — 6370000000 HC RX 637 (ALT 250 FOR IP): Performed by: EMERGENCY MEDICINE

## 2023-12-08 PROCEDURE — 99283 EMERGENCY DEPT VISIT LOW MDM: CPT

## 2023-12-08 PROCEDURE — 73030 X-RAY EXAM OF SHOULDER: CPT

## 2023-12-08 RX ORDER — NAPROXEN 500 MG/1
500 TABLET ORAL 2 TIMES DAILY WITH MEALS
Qty: 14 TABLET | Refills: 0 | Status: SHIPPED | OUTPATIENT
Start: 2023-12-08 | End: 2023-12-15

## 2023-12-08 RX ORDER — METHOCARBAMOL 750 MG/1
750 TABLET, FILM COATED ORAL ONCE
Status: COMPLETED | OUTPATIENT
Start: 2023-12-08 | End: 2023-12-08

## 2023-12-08 RX ORDER — IBUPROFEN 600 MG/1
600 TABLET ORAL
Status: COMPLETED | OUTPATIENT
Start: 2023-12-08 | End: 2023-12-08

## 2023-12-08 RX ADMIN — IBUPROFEN 600 MG: 600 TABLET, FILM COATED ORAL at 17:40

## 2023-12-08 RX ADMIN — METHOCARBAMOL TABLETS 750 MG: 750 TABLET, COATED ORAL at 17:39
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
